# Patient Record
Sex: MALE | Race: WHITE | URBAN - METROPOLITAN AREA
[De-identification: names, ages, dates, MRNs, and addresses within clinical notes are randomized per-mention and may not be internally consistent; named-entity substitution may affect disease eponyms.]

---

## 2018-03-14 RX ORDER — MULTIPLE VITAMINS W/ MINERALS TAB 9MG-400MCG
1 TAB ORAL DAILY
COMMUNITY

## 2018-03-14 RX ORDER — BACLOFEN 20 MG
1 TABLET ORAL
COMMUNITY

## 2018-03-20 ENCOUNTER — ANESTHESIA EVENT (OUTPATIENT)
Dept: SURGERY | Facility: CLINIC | Age: 73
End: 2018-03-20
Payer: MEDICARE

## 2018-03-20 ENCOUNTER — ANESTHESIA (OUTPATIENT)
Dept: SURGERY | Facility: CLINIC | Age: 73
End: 2018-03-20
Payer: MEDICARE

## 2018-03-20 ENCOUNTER — SURGERY (OUTPATIENT)
Age: 73
End: 2018-03-20

## 2018-03-20 ENCOUNTER — HOSPITAL ENCOUNTER (OUTPATIENT)
Facility: CLINIC | Age: 73
Discharge: HOME | End: 2018-03-20
Attending: OPHTHALMOLOGY | Admitting: OPHTHALMOLOGY
Payer: MEDICARE

## 2018-03-20 VITALS
BODY MASS INDEX: 30.11 KG/M2 | OXYGEN SATURATION: 94 % | DIASTOLIC BLOOD PRESSURE: 83 MMHG | RESPIRATION RATE: 16 BRPM | SYSTOLIC BLOOD PRESSURE: 142 MMHG | TEMPERATURE: 98 F | HEART RATE: 60 BPM | HEIGHT: 69 IN | WEIGHT: 203.3 LBS

## 2018-03-20 PROCEDURE — 36000058 ZZH SURGERY LEVEL 3 EA 15 ADDTL MIN: Performed by: OPHTHALMOLOGY

## 2018-03-20 PROCEDURE — 25000125 ZZHC RX 250: Performed by: OPHTHALMOLOGY

## 2018-03-20 PROCEDURE — 37000008 ZZH ANESTHESIA TECHNICAL FEE, 1ST 30 MIN: Performed by: OPHTHALMOLOGY

## 2018-03-20 PROCEDURE — 27210794 ZZH OR GENERAL SUPPLY STERILE: Performed by: OPHTHALMOLOGY

## 2018-03-20 PROCEDURE — 25000131 ZZH RX MED GY IP 250 OP 636 PS 637: Mod: GY | Performed by: NURSE ANESTHETIST, CERTIFIED REGISTERED

## 2018-03-20 PROCEDURE — 88305 TISSUE EXAM BY PATHOLOGIST: CPT | Performed by: OPHTHALMOLOGY

## 2018-03-20 PROCEDURE — 88305 TISSUE EXAM BY PATHOLOGIST: CPT | Mod: 26 | Performed by: OPHTHALMOLOGY

## 2018-03-20 PROCEDURE — 37000009 ZZH ANESTHESIA TECHNICAL FEE, EACH ADDTL 15 MIN: Performed by: OPHTHALMOLOGY

## 2018-03-20 PROCEDURE — 71000013 ZZH RECOVERY PHASE 1 LEVEL 1 EA ADDTL HR: Performed by: OPHTHALMOLOGY

## 2018-03-20 PROCEDURE — 25000125 ZZHC RX 250: Performed by: NURSE ANESTHETIST, CERTIFIED REGISTERED

## 2018-03-20 PROCEDURE — 71000012 ZZH RECOVERY PHASE 1 LEVEL 1 FIRST HR: Performed by: OPHTHALMOLOGY

## 2018-03-20 PROCEDURE — 25000128 H RX IP 250 OP 636: Performed by: NURSE ANESTHETIST, CERTIFIED REGISTERED

## 2018-03-20 PROCEDURE — 40000170 ZZH STATISTIC PRE-PROCEDURE ASSESSMENT II: Performed by: OPHTHALMOLOGY

## 2018-03-20 PROCEDURE — 36000056 ZZH SURGERY LEVEL 3 1ST 30 MIN: Performed by: OPHTHALMOLOGY

## 2018-03-20 PROCEDURE — A9270 NON-COVERED ITEM OR SERVICE: HCPCS | Mod: GY | Performed by: ANESTHESIOLOGY

## 2018-03-20 PROCEDURE — 25000128 H RX IP 250 OP 636: Performed by: ANESTHESIOLOGY

## 2018-03-20 PROCEDURE — 71000027 ZZH RECOVERY PHASE 2 EACH 15 MINS: Performed by: OPHTHALMOLOGY

## 2018-03-20 PROCEDURE — 25000132 ZZH RX MED GY IP 250 OP 250 PS 637: Mod: GY | Performed by: ANESTHESIOLOGY

## 2018-03-20 RX ORDER — SODIUM CHLORIDE, SODIUM LACTATE, POTASSIUM CHLORIDE, CALCIUM CHLORIDE 600; 310; 30; 20 MG/100ML; MG/100ML; MG/100ML; MG/100ML
INJECTION, SOLUTION INTRAVENOUS CONTINUOUS
Status: DISCONTINUED | OUTPATIENT
Start: 2018-03-20 | End: 2018-03-20 | Stop reason: HOSPADM

## 2018-03-20 RX ORDER — NALOXONE HYDROCHLORIDE 0.4 MG/ML
.1-.4 INJECTION, SOLUTION INTRAMUSCULAR; INTRAVENOUS; SUBCUTANEOUS
Status: DISCONTINUED | OUTPATIENT
Start: 2018-03-20 | End: 2018-03-20 | Stop reason: HOSPADM

## 2018-03-20 RX ORDER — ONDANSETRON 2 MG/ML
INJECTION INTRAMUSCULAR; INTRAVENOUS PRN
Status: DISCONTINUED | OUTPATIENT
Start: 2018-03-20 | End: 2018-03-20

## 2018-03-20 RX ORDER — MEPERIDINE HYDROCHLORIDE 25 MG/ML
12.5 INJECTION INTRAMUSCULAR; INTRAVENOUS; SUBCUTANEOUS
Status: DISCONTINUED | OUTPATIENT
Start: 2018-03-20 | End: 2018-03-20 | Stop reason: HOSPADM

## 2018-03-20 RX ORDER — LIDOCAINE HCL/EPINEPHRINE/PF 2%-1:200K
VIAL (ML) INJECTION PRN
Status: DISCONTINUED | OUTPATIENT
Start: 2018-03-20 | End: 2018-03-20 | Stop reason: HOSPADM

## 2018-03-20 RX ORDER — ONDANSETRON 2 MG/ML
4 INJECTION INTRAMUSCULAR; INTRAVENOUS EVERY 30 MIN PRN
Status: DISCONTINUED | OUTPATIENT
Start: 2018-03-20 | End: 2018-03-20 | Stop reason: HOSPADM

## 2018-03-20 RX ORDER — HYDROCODONE BITARTRATE AND ACETAMINOPHEN 5; 325 MG/1; MG/1
1 TABLET ORAL ONCE
Status: COMPLETED | OUTPATIENT
Start: 2018-03-20 | End: 2018-03-20

## 2018-03-20 RX ORDER — LIDOCAINE HYDROCHLORIDE 20 MG/ML
INJECTION, SOLUTION INFILTRATION; PERINEURAL PRN
Status: DISCONTINUED | OUTPATIENT
Start: 2018-03-20 | End: 2018-03-20

## 2018-03-20 RX ORDER — FENTANYL CITRATE 50 UG/ML
25-50 INJECTION, SOLUTION INTRAMUSCULAR; INTRAVENOUS EVERY 5 MIN PRN
Status: DISCONTINUED | OUTPATIENT
Start: 2018-03-20 | End: 2018-03-20 | Stop reason: HOSPADM

## 2018-03-20 RX ORDER — HYDROCODONE BITARTRATE AND ACETAMINOPHEN 5; 325 MG/1; MG/1
1 TABLET ORAL ONCE
Status: DISCONTINUED | OUTPATIENT
Start: 2018-03-20 | End: 2018-03-20 | Stop reason: HOSPADM

## 2018-03-20 RX ORDER — LABETALOL HYDROCHLORIDE 5 MG/ML
INJECTION, SOLUTION INTRAVENOUS PRN
Status: DISCONTINUED | OUTPATIENT
Start: 2018-03-20 | End: 2018-03-20

## 2018-03-20 RX ORDER — HYDRALAZINE HYDROCHLORIDE 20 MG/ML
INJECTION INTRAMUSCULAR; INTRAVENOUS PRN
Status: DISCONTINUED | OUTPATIENT
Start: 2018-03-20 | End: 2018-03-20

## 2018-03-20 RX ORDER — SODIUM CHLORIDE, SODIUM LACTATE, POTASSIUM CHLORIDE, CALCIUM CHLORIDE 600; 310; 30; 20 MG/100ML; MG/100ML; MG/100ML; MG/100ML
INJECTION, SOLUTION INTRAVENOUS CONTINUOUS PRN
Status: DISCONTINUED | OUTPATIENT
Start: 2018-03-20 | End: 2018-03-20

## 2018-03-20 RX ORDER — PHYSOSTIGMINE SALICYLATE 1 MG/ML
1.2 INJECTION INTRAVENOUS
Status: DISCONTINUED | OUTPATIENT
Start: 2018-03-20 | End: 2018-03-20 | Stop reason: HOSPADM

## 2018-03-20 RX ORDER — ERYTHROMYCIN 5 MG/G
OINTMENT OPHTHALMIC PRN
Status: DISCONTINUED | OUTPATIENT
Start: 2018-03-20 | End: 2018-03-20 | Stop reason: HOSPADM

## 2018-03-20 RX ORDER — FENTANYL CITRATE 50 UG/ML
25-50 INJECTION, SOLUTION INTRAMUSCULAR; INTRAVENOUS
Status: DISCONTINUED | OUTPATIENT
Start: 2018-03-20 | End: 2018-03-20 | Stop reason: HOSPADM

## 2018-03-20 RX ORDER — ONDANSETRON 4 MG/1
4 TABLET, ORALLY DISINTEGRATING ORAL EVERY 30 MIN PRN
Status: DISCONTINUED | OUTPATIENT
Start: 2018-03-20 | End: 2018-03-20 | Stop reason: HOSPADM

## 2018-03-20 RX ORDER — PROPOFOL 10 MG/ML
INJECTION, EMULSION INTRAVENOUS PRN
Status: DISCONTINUED | OUTPATIENT
Start: 2018-03-20 | End: 2018-03-20

## 2018-03-20 RX ORDER — FENTANYL CITRATE 50 UG/ML
INJECTION, SOLUTION INTRAMUSCULAR; INTRAVENOUS PRN
Status: DISCONTINUED | OUTPATIENT
Start: 2018-03-20 | End: 2018-03-20

## 2018-03-20 RX ADMIN — HYDRALAZINE HYDROCHLORIDE 5 MG: 20 INJECTION INTRAMUSCULAR; INTRAVENOUS at 10:26

## 2018-03-20 RX ADMIN — ERYTHROMYCIN 1 G: 5 OINTMENT OPHTHALMIC at 10:28

## 2018-03-20 RX ADMIN — LIDOCAINE HYDROCHLORIDE 60 MG: 20 INJECTION, SOLUTION INFILTRATION; PERINEURAL at 08:34

## 2018-03-20 RX ADMIN — FENTANYL CITRATE 25 MCG: 50 INJECTION, SOLUTION INTRAMUSCULAR; INTRAVENOUS at 08:34

## 2018-03-20 RX ADMIN — MIDAZOLAM 1 MG: 1 INJECTION INTRAMUSCULAR; INTRAVENOUS at 09:51

## 2018-03-20 RX ADMIN — PROPOFOL 30 MG: 10 INJECTION, EMULSION INTRAVENOUS at 08:34

## 2018-03-20 RX ADMIN — HYDRALAZINE HYDROCHLORIDE 5 MG: 20 INJECTION INTRAMUSCULAR; INTRAVENOUS at 10:31

## 2018-03-20 RX ADMIN — FENTANYL CITRATE 50 MCG: 50 INJECTION INTRAMUSCULAR; INTRAVENOUS at 11:27

## 2018-03-20 RX ADMIN — LIDOCAINE HYDROCHLORIDE,EPINEPHRINE BITARTRATE 8 ML: 20; .005 INJECTION, SOLUTION EPIDURAL; INFILTRATION; INTRACAUDAL; PERINEURAL at 08:46

## 2018-03-20 RX ADMIN — LABETALOL HYDROCHLORIDE 10 MG: 5 INJECTION INTRAVENOUS at 10:17

## 2018-03-20 RX ADMIN — SODIUM CHLORIDE, POTASSIUM CHLORIDE, SODIUM LACTATE AND CALCIUM CHLORIDE: 600; 310; 30; 20 INJECTION, SOLUTION INTRAVENOUS at 08:33

## 2018-03-20 RX ADMIN — FENTANYL CITRATE 25 MCG: 50 INJECTION, SOLUTION INTRAMUSCULAR; INTRAVENOUS at 10:14

## 2018-03-20 RX ADMIN — ONDANSETRON 4 MG: 2 INJECTION INTRAMUSCULAR; INTRAVENOUS at 08:34

## 2018-03-20 RX ADMIN — FENTANYL CITRATE 25 MCG: 50 INJECTION, SOLUTION INTRAMUSCULAR; INTRAVENOUS at 08:38

## 2018-03-20 RX ADMIN — LABETALOL HYDROCHLORIDE 5 MG: 5 INJECTION INTRAVENOUS at 10:15

## 2018-03-20 RX ADMIN — DEXMEDETOMIDINE HYDROCHLORIDE 4 MCG: 100 INJECTION, SOLUTION INTRAVENOUS at 10:11

## 2018-03-20 RX ADMIN — ERYTHROMYCIN 2 G: 5 OINTMENT OPHTHALMIC at 08:48

## 2018-03-20 RX ADMIN — FENTANYL CITRATE 25 MCG: 50 INJECTION, SOLUTION INTRAMUSCULAR; INTRAVENOUS at 09:54

## 2018-03-20 RX ADMIN — PROPOFOL 20 MG: 10 INJECTION, EMULSION INTRAVENOUS at 08:38

## 2018-03-20 RX ADMIN — LIDOCAINE HYDROCHLORIDE,EPINEPHRINE BITARTRATE 8 ML: 20; .005 INJECTION, SOLUTION EPIDURAL; INFILTRATION; INTRACAUDAL; PERINEURAL at 10:26

## 2018-03-20 RX ADMIN — DEXMEDETOMIDINE HYDROCHLORIDE 8 MCG: 100 INJECTION, SOLUTION INTRAVENOUS at 10:19

## 2018-03-20 RX ADMIN — HYDROCODONE BITARTRATE AND ACETAMINOPHEN 1 TABLET: 5; 325 TABLET ORAL at 11:46

## 2018-03-20 RX ADMIN — MIDAZOLAM 1 MG: 1 INJECTION INTRAMUSCULAR; INTRAVENOUS at 08:34

## 2018-03-20 NOTE — OP NOTE
Procedure Date: 03/20/2018      PREOPERATIVE DIAGNOSES:    1.  Bilateral upper eyelid ptosis.     2.  Bilateral upper eyelid mechanical ptosis.    3.  Bilateral eyebrow ptosis.     4.  Right lower eyelid lesion.     5.  Bilateral lower eyelid dermatochalasis.        POSTOPERATIVE DIAGNOSES:   1.  Bilateral upper eyelid ptosis.     2.  Bilateral upper eyelid mechanical ptosis.    3.  Bilateral eyebrow ptosis.     4.  Right lower eyelid lesion.     5.  Bilateral lower eyelid dermatochalasis.        PROCEDURES:    1.  Repair of bilateral upper eyelid ptosis.     2.  Repair of bilateral upper eyelid mechanical ptosis.     3.  Bilateral eyebrow ptosis repair.   4.  Full thickness wedge excision right lower eyelid.     5.  Bilateral lower eyelid festoon excision.        SURGEON:  Cheng Calabrese MD       ANESTHESIA:  Local, monitored.       COMPLICATIONS:  None.      INDICATIONS FOR PROCEDURE:   The patient has bilateral upper eyelid ptosis, obstructing his vision and interfering with daily activities.  The patient also had excess skin overhanging the upper eyelids, contributing to visual obstruction.  Finally, the eyebrows were resting below the orbital rim, also contributing to visual obstruction.  The patient also had a vascular lesion in the lateral right lower eyelid which demonstrated recent growth.  The patient also requested excision of festooning skin in both lower eyelids.        DESCRIPTION OF PROCEDURE/BILATERAL EYELID PTOSIS:  The patient was taken to the Operating Room and received a local block of 2% Lidocaine without epinephrine.  The block was administered transcutaneously along the eyelid crease and the planned incision line was outlined with a marking pen.  The patient was then prepped and draped in the usual sterile fashion.  The incision was then made along the previously marked area.  A moderate amount of skin was excised taking care to allow adequate closure and avoid lagophthalmos. Lou scissors  were then used to develop a plane over the septum.  The septum was opened and a small amount of orbital fat was removed.  Levator aponeurosis was then disinserted from the tarsus and a plane was developed between the aponeurosis and the underlying tarsus and Pretty's muscle.  A 5-0 Mersilene suture was then passed through the tarsus in a lamellar fashion and externalized through the levator aponeurosis.  This was tied off in a temporary fashion and the patient was asked to sit up and the eyelids height and contour evaluated.  This was repeated until a satisfactory height and contour were obtained, and two additional sutures were placed lateral to the initial suture.  This resulted in a normal contour and height of bilateral upper eyelids.  Attempted overcorrection of 0.5 mm was obtained.  The redundant levator aponeurosis was then excised and the skin was then closed with running 6-0 fast absorbing suture.  The patient left the Operating Room in stable condition.       BILATERAL EYEBROW PTOSIS REPAIR:  The patient was taken into the Operating Room and the planned excision was outlined along the upper aspect of both eyebrows. The eyebrows were then injected with 2% lidocaine with 1:100,000 epinephrine. The patient was prepped and draped in the usual sterile fashion. An ellipse of skin and subcutaneous tissue was excised with a #15 blade and cutting cautery. Care was taken to allow for good eyelid closure. Hemostasis was obtained with the [facility] cautery. Deep tissues were then closed with interrupted 5-0 Vicryl suture. The skin was closed with running 6-0 nylon suture, taking care to mona the skin margins. The patient left the Operating Room in stable condition.      In the right lower eyelid, a full-thickness incision was made medial and lateral to the clinically involved area. Hemostasis was obtained with needle-tipped [facility] cautery.  Deep tissues were closed with 5-0 Vicryl suture at the level of the  tarsal plate.  Eyelid margin was closed with 6-0 chromic at the gray line, lash line and pretarsal skin.  The planned excision of skin in the lower eyelids was outlined with a fine-tipped marking pen.  The redundant skin was excised using a 15 blade and Esau scissors.  Care was taken to leave adequate skin for good eyelid closure.  The patient tolerated the procedure well.              YURY GAY MD             D: 2018   T: 2018   MT: JEAN MARIE      Name:     AYESHA VELAZQUEZ   MRN:      [id number]        Account:        [id number]   :      1945           Procedure Date: 2018      Document: [id number]

## 2018-03-20 NOTE — ANESTHESIA CARE TRANSFER NOTE
Patient: Merle Sahran    Procedure(s):  BILATERAL UPPER LID PTOSIS, MECHANICAL PTOSIS AND BROW PTOSIS, RIGHT LOWER LID WEDGE EXCISION, COSMETIC BILATERAL LOWER LID BLEPHAROPLASTY  - Wound Class: I-Clean   - Wound Class: I-Clean   - Wound Class: I-Clean   - Wound Class: I-Clean    Diagnosis: BILATERAL UPPER LID PTOSIS, MECHANICAL AND ROW, RIGHT LOWER LID LESION   Diagnosis Additional Information: No value filed.    Anesthesia Type:   MAC     Note:  Airway :Room Air  Patient transferred to:PACU  Comments: Transferred to [facility] recovery room in recliner with armrests up, spontaneous respirations, O2 saturation maintained at 95% on room air. Pt sat on edge of bed and denied dizziness prior to transfer B/P 167/91 upon transfer to PACU. All monitors and alarms on and functioning, clinically stable vital signs. Report given to recovery RN and questions answered. Patient alert and following verbal directions.Handoff Report: Identifed the Patient, Identified the Reponsible Provider, Reviewed the pertinent medical history, Discussed the surgical course, Reviewed Intra-OP anesthesia mangement and issues during anesthesia, Set expectations for post-procedure period and Allowed opportunity for questions and acknowledgement of understanding      Vitals: (Last set prior to Anesthesia Care Transfer)    CRNA VITALS  3/20/2018 1004 - 3/20/2018 1041      3/20/2018             NIBP: (!)  173/99    Pulse: 69    NIBP Mean: 132    SpO2: 94 %    Resp Rate (set): 10                Electronically Signed By: ALESSANDRO Vallejo CRNA  March 20, 2018  10:41 AM

## 2018-03-20 NOTE — DISCHARGE INSTRUCTIONS
Dr. Calabrese has prescribed Norco/Vicodin for pain for you. It's a combination medication of Tylenol 325 mg and Hydrocodone 5 mg.  There is a limit of 4000 mg of Tylenol per 24 hours.   You were given one of these pain pills at 11:30 am today.     You also were prescribed Erythromycin ointment.  Apply per pharmacy label instructions.        Same Day Surgery Discharge Instructions for  Sedation and General Anesthesia       It's not unusual to feel dizzy, light-headed or faint for up to 24 hours after surgery or while taking pain medication.  If you have these symptoms: sit for a few minutes before standing and have someone assist you when you get up to walk or use the bathroom.      You should rest and relax for the next 24 hours. We recommend you make arrangements to have an adult stay with you for at least 24 hours after your discharge.  Avoid hazardous and strenuous activity.      DO NOT DRIVE any vehicle or operate mechanical equipment for 24 hours following the end of your surgery.  Even though you may feel normal, your reactions may be affected by the medication you have received.      Do not drink alcoholic beverages for 24 hours following surgery.       Slowly progress to your regular diet as you feel able. It's not unusual to feel nauseated and/or vomit after receiving anesthesia.  If you develop these symptoms, drink clear liquids (apple juice, ginger ale, broth, 7-up, etc. ) until you feel better.  If your nausea and vomiting persists for 24 hours, please notify your surgeon.        All narcotic pain medications, along with inactivity and anesthesia, can cause constipation. Drinking plenty of liquids and increasing fiber intake will help.      For any questions of a medical nature, call your surgeon.      Do not make important decisions for 24 hours.      If you had general anesthesia, you may have a sore throat for a couple of days related to the breathing tube used during surgery.  You may use Cepacol  lozenges to help with this discomfort.  If it worsens or if you develop a fever, contact your surgeon.       If you feel your pain is not well managed with the pain medications prescribed by your surgeon, please contact your surgeon's office to let them know so they can address your concerns.         [facility]   Eyelid/Orbital Surgery Discharge Instructions    Cheng Calabrese M.D..     ICE COMPRESSES  Immediately following surgery, you should begin to apply ice compresses.  Apply a cold gel pack or wrap a clean washcloth around a cup of crushed ice in a plastic bag (a bag of frozen peas also works well) and hold the cold compresses directly against the closed eyelid (s).Apply cold pack for a minimum of six times daily for no longer than 15 minutes at a time. Continue cold compresses every day until the bruising and swelling begin to subside.  This can vary for each patient, but three 3 days may be common.    HOT COMPRESSES  After your swelling and bruising have begun to subside, hot compresses should be applied.  Take a clean washcloth and wring it out in hot water (as warm as you can tolerate comfortably).  Hold this warm compress against the closed eyelid(s) at least six times per day for 15 minutes.  This should be continued for about two weeks.    OINTMENT  You may be given some ointment when you leave the hospital.  Apply this ointment to the suture line(s) twice a day, 1/4 inch into the eye at bedtime for 7 days.  Expect some blurring of vision from the ointment.     ACTIVITY  Avoid heavy lifting or vigorous exercise for one week after surgery.  You may resume regular activities as tolerated.  You may shower and wash your hair on the day after surgery; be careful to avoid getting shampoo in your eyes. While your eyes are still swelling, it is recommended you sleep on your back and elevate your head with 2-3 pillows.    MEDICATION  If the doctor has given you some medications to take after  surgery, please take these according to the instructions on the bottle.  Pain medications may make you drowsy so do not drive, operate heavy machinery, or use alcohol while taking it.  When you feel that you do not need the prescription pain medication, you may substitute Extra Strength Tylenol for mild pain by also following the directions on the bottle.    If you were taking Coumadin (warfarin) prior to your surgery, you may resume this medication with your next scheduled dose.    WHAT TO EXPECT  You should expect some slight oozing of blood from the incision site over the first two to three days after surgery.  Swelling and bruising will occur for one to two weeks or longer.  You may also experience itching and tearing during the first several weeks after surgery.  This is part of the normal healing process    QUESTIONS  Please feel free to contact the office, should you have any questions that are not answered above.  The phone number is [phone].  Please call immediately if you are unable to establish vision in the operative eye, you are experiencing heavy bleeding that will not stop with gentle pressure or you have any signs of an infection (greenish/yellow discharge or progressive redness).    [facility]  [facility]  [address]  [place], [place] [address]        **If you have questions or concerns about your procedure,  call Dr. Calabrese at [phone]**

## 2018-03-20 NOTE — IP AVS SNAPSHOT
[facility] Same Day Surgery    [address]    [place] [address]    Phone:  [phone]    Fax:  [phone]                                       After Visit Summary   3/20/2018    Jeri Tsang    MRN: [id number]           After Visit Summary Signature Page     I have received my discharge instructions, and my questions have been answered. I have discussed any challenges I see with this plan with the nurse or doctor.    ..........................................................................................................................................  Patient/Patient Representative Signature      ..........................................................................................................................................  Patient Representative Print Name and Relationship to Patient    ..................................................               ................................................  Date                                            Time    ..........................................................................................................................................  Reviewed by Signature/Title    ...................................................              ..............................................  Date                                                            Time

## 2018-03-20 NOTE — BRIEF OP NOTE
[facility] Brief Operative Note    Pre-operative diagnosis: BILATERAL UPPER LID PTOSIS, MECHANICAL AND ROW, RIGHT LOWER LID LESION    Post-operative diagnosis * No post-op diagnosis entered *     Procedure: Procedure(s):  BILATERAL UPPER LID PTOSIS, MECHANICAL PTOSIS AND BROW PTOSIS, RIGHT LOWER LID WEDGE EXCISION, COSMETIC BILATERAL LOWER LID BLEPHAROPLASTY  - Wound Class: I-Clean   - Wound Class: I-Clean   - Wound Class: I-Clean   - Wound Class: I-Clean   Surgeon(s): Surgeon(s) and Role:  Panel 1:     * Cheng Calabrese MD - Primary    Panel 2:     * Cheng Calabrese MD - Primary   Estimated blood loss: * No values recorded between 3/20/2018  8:45 AM and 3/20/2018 10:35 AM *    Specimens:   ID Type Source Tests Collected by Time Destination   A : Lesion Right Lower Eyelid Tissue Lower Eyelid SURGICAL PATHOLOGY EXAM Cheng Calabrese MD 3/20/2018  8:47 AM       Findings:

## 2018-03-20 NOTE — ANESTHESIA PREPROCEDURE EVALUATION
"  Anesthesia Evaluation     . Pt has had prior anesthetic.     History of anesthetic complications (difficult intubation by H&P; patient unaware of this)   - difficult intubation        ROS/MED HX    ENT/Pulmonary:     (+)sleep apnea, uses CPAP , . .    Neurologic:     (+)neuropathy - peripheral,     Cardiovascular:     (+) hypertension----. : . . . :. .       METS/Exercise Tolerance:     Hematologic:         Musculoskeletal:   (+) , , other musculoskeletal- S/P \"spinal fixation surgery\"      GI/Hepatic:     (+) GERD Asymptomatic on medication,       Renal/Genitourinary:         Endo:         Psychiatric:     (+) psychiatric history bipolar and depression      Infectious Disease:         Malignancy:         Other:                     Physical Exam  Normal systems: cardiovascular and pulmonary    Airway   Mallampati: II  TM distance: >3 FB  Neck ROM: limited    Dental     Cardiovascular       Pulmonary                     Anesthesia Plan      History & Physical Review  History and physical reviewed and following examination; no interval change.    ASA Status:  2 .    NPO Status:  > 8 hours    Plan for MAC          Postoperative Care  Postoperative pain management:  IV analgesics and Oral pain medications.      Consents        DPreop diagnosis: BILATERAL UPPER LID PTOSIS, MECHANICAL AND ROW, RIGHT LOWER LID LESION   Procedure(s):  REPAIR PTOSIS BILATERAL  REPAIR PTOSIS BROW BILATERAL  EXCISE LESION EYELID  COSMETIC BLEPHAROPLASTY LOWER LIDS BILATERAL  Allergies   Allergen Reactions     Adhesive Tape      Silicone       No current facility-administered medications on file prior to encounter.   No current outpatient prescriptions on file prior to encounter.  No results found for: HGB, INR, POTASSIUM                    .  "

## 2018-03-20 NOTE — IP AVS SNAPSHOT
MRN:[id number]                      After Visit Summary   3/20/2018    Jeri Tsang    MRN: [id number]           Thank you!     Thank you for choosing [facility] for your care. Our goal is always to provide you with excellent care. Hearing back from our patients is one way we can continue to improve our services. Please take a few minutes to complete the written survey that you may receive in the mail after you visit with us. Thank you!        Patient Information     Date Of Birth          1945        About your hospital stay     You were admitted on:  March 20, 2018 You last received care in the:  [facility] Same Day Surgery    You were discharged on:  March 20, 2018       Who to Call     For medical emergencies, please call 911.  For non-urgent questions about your medical care, please call your primary care provider or clinic, [phone]  For questions related to your surgery, please call your surgery clinic        Attending Provider     Provider Specialty    Cheng Calabrese MD Ophthalmology       Primary Care Provider Office Phone # Fax #    Miguel Dean [phone]      After Care Instructions     Discharge Medication Instructions       Do NOT take aspirin or medications containing NSAIDS for 72 hours after procedure.            Ice to affected area       Apply cold pack for 15 minutes on, 15 minutes off, for 48 hours while awake.                  Further instructions from your care team         Dr. Calabrese has prescribed Norco/Vicodin for pain for you. It's a combination medication of Tylenol 325 mg and Hydrocodone 5 mg.  There is a limit of 4000 mg of Tylenol per 24 hours.   You were given one of these pain pills at 11:30 am today.     You also were prescribed Erythromycin ointment.  Apply per pharmacy label instructions.        Same Day Surgery Discharge Instructions for  Sedation and General Anesthesia       It's not unusual to feel dizzy,  light-headed or faint for up to 24 hours after surgery or while taking pain medication.  If you have these symptoms: sit for a few minutes before standing and have someone assist you when you get up to walk or use the bathroom.      You should rest and relax for the next 24 hours. We recommend you make arrangements to have an adult stay with you for at least 24 hours after your discharge.  Avoid hazardous and strenuous activity.      DO NOT DRIVE any vehicle or operate mechanical equipment for 24 hours following the end of your surgery.  Even though you may feel normal, your reactions may be affected by the medication you have received.      Do not drink alcoholic beverages for 24 hours following surgery.       Slowly progress to your regular diet as you feel able. It's not unusual to feel nauseated and/or vomit after receiving anesthesia.  If you develop these symptoms, drink clear liquids (apple juice, ginger ale, broth, 7-up, etc. ) until you feel better.  If your nausea and vomiting persists for 24 hours, please notify your surgeon.        All narcotic pain medications, along with inactivity and anesthesia, can cause constipation. Drinking plenty of liquids and increasing fiber intake will help.      For any questions of a medical nature, call your surgeon.      Do not make important decisions for 24 hours.      If you had general anesthesia, you may have a sore throat for a couple of days related to the breathing tube used during surgery.  You may use Cepacol lozenges to help with this discomfort.  If it worsens or if you develop a fever, contact your surgeon.       If you feel your pain is not well managed with the pain medications prescribed by your surgeon, please contact your surgeon's office to let them know so they can address your concerns.         [facility]   Eyelid/Orbital Surgery Discharge Instructions    Cheng Calabrese M.D..     ICE COMPRESSES  Immediately following surgery, you  should begin to apply ice compresses.  Apply a cold gel pack or wrap a clean washcloth around a cup of crushed ice in a plastic bag (a bag of frozen peas also works well) and hold the cold compresses directly against the closed eyelid (s).Apply cold pack for a minimum of six times daily for no longer than 15 minutes at a time. Continue cold compresses every day until the bruising and swelling begin to subside.  This can vary for each patient, but three 3 days may be common.    HOT COMPRESSES  After your swelling and bruising have begun to subside, hot compresses should be applied.  Take a clean washcloth and wring it out in hot water (as warm as you can tolerate comfortably).  Hold this warm compress against the closed eyelid(s) at least six times per day for 15 minutes.  This should be continued for about two weeks.    OINTMENT  You may be given some ointment when you leave the hospital.  Apply this ointment to the suture line(s) twice a day, 1/4 inch into the eye at bedtime for 7 days.  Expect some blurring of vision from the ointment.     ACTIVITY  Avoid heavy lifting or vigorous exercise for one week after surgery.  You may resume regular activities as tolerated.  You may shower and wash your hair on the day after surgery; be careful to avoid getting shampoo in your eyes. While your eyes are still swelling, it is recommended you sleep on your back and elevate your head with 2-3 pillows.    MEDICATION  If the doctor has given you some medications to take after surgery, please take these according to the instructions on the bottle.  Pain medications may make you drowsy so do not drive, operate heavy machinery, or use alcohol while taking it.  When you feel that you do not need the prescription pain medication, you may substitute Extra Strength Tylenol for mild pain by also following the directions on the bottle.    If you were taking Coumadin (warfarin) prior to your surgery, you may resume this medication with  "your next scheduled dose.    WHAT TO EXPECT  You should expect some slight oozing of blood from the incision site over the first two to three days after surgery.  Swelling and bruising will occur for one to two weeks or longer.  You may also experience itching and tearing during the first several weeks after surgery.  This is part of the normal healing process    QUESTIONS  Please feel free to contact the office, should you have any questions that are not answered above.  The phone number is [phone].  Please call immediately if you are unable to establish vision in the operative eye, you are experiencing heavy bleeding that will not stop with gentle pressure or you have any signs of an infection (greenish/yellow discharge or progressive redness).    [facility]  [facility] [place] [address]  [place], [place] [address]        **If you have questions or concerns about your procedure,  call Dr. Calabrese at [phone]**          Pending Results     Date and Time Order Name Status Description    3/20/2018 0851 Surgical pathology exam In process             Admission Information     Date & Time Provider Department Dept. Phone    3/20/2018 Cheng Calabrese MD [facility] Same Day Surgery [phone]      Your Vitals Were     Blood Pressure Pulse Temperature Respirations Height Weight    167/89 60 98  F (36.7  C) (Temporal) 17 1.753 m (5' 9\") 92.2 kg (203 lb 4.8 oz)    Pulse Oximetry BMI (Body Mass Index)                93% 30.02 kg/m2          [employer] Information     [employer] lets you send messages to your doctor, view your test results, renew your prescriptions, schedule appointments and more. To sign up, go to [email or web address] . Click on \"Log in\" on the left side of the screen, which will take you to the Welcome page. Then click on \"Sign up Now\" on the right side of the page.     You will be asked to enter the access code listed " below, as well as some personal information. Please follow the directions to create your username and password.     Your access code is: [email or web address]  Expires: 2018 11:55 AM     Your access code will  in 90 days. If you need help or a new code, please call your [place] clinic or [phone].        Care EveryWhere ID     This is your Care EveryWhere ID. This could be used by other organizations to access your [place] medical records  [id number]        Equal Access to Services     ANH BURNS : Hadpawel navarrete Sodesiree, walitzyda luqadaha, qaybta kaalmajaret espinoza, chong elise . So [facility] [phone].    ATENCIÓN: Si habla español, tiene a rodriguez disposición servicios gratuitos de asistencia lingüística. Llame al [phone].    We comply with applicable federal civil rights laws and [place] laws. We do not discriminate on the basis of race, color, national origin, age, disability, sex, sexual orientation, or gender identity.               Review of your medicines      CONTINUE these medicines which have NOT CHANGED        Dose / Directions    ALPHA-LIPOIC ACID PO        Dose:  300 mg   Take 300 mg by mouth daily (with breakfast) (3 x 300 mg = 900 mg dose)   Refills:  0       CALCIUM PO        Dose:  1000 mg   Take 1,000 mg by mouth daily (with dinner)   Refills:  0       CHERRY PO        Dose:  1000 mg   Take 1,000 mg by mouth 3 times daily (2 x 500 mg = 1000 mg dose)   Refills:  0       Coenzyme Q10-Levocarnitine  MG Caps        Dose:  1 capsule   Take 1 capsule by mouth daily (with dinner)   Refills:  0       * FISH OIL PO        Dose:  1000 mg   Take 1,000 mg by mouth daily   Refills:  0       * FISH OIL PO        Dose:  800 mg   Take 800 mg by mouth daily (with dinner)   Refills:  0       * GLUCOSAMINE CHONDROITIN MSM PO        Dose:  1 tablet   Take 1 tablet by mouth 3 times daily   Refills:  0       * PROSTATE THERAPY COMPLEX PO        Dose:  2 tablet   Take  2 tablets by mouth daily   Refills:  0       GLUCOSAMINE SULFATE PO        Dose:  1 tablet   Take 1 tablet by mouth 2 times daily   Refills:  0       GRAPESEED EXTRACT PO        Dose:  100 mg   Take 100 mg by mouth every 24 hours At 12:00   Refills:  0       LEXAPRO PO        Dose:  20 mg   Take 20 mg by mouth daily   Refills:  0       LISINOPRIL PO        Dose:  40 mg   Take 40 mg by mouth daily   Refills:  0       MAGNESIUM PO        Dose:  500 mg   Take 500 mg by mouth daily (with dinner)   Refills:  0       MILK THISTLE PO        Dose:  200 mg   Take 200 mg by mouth daily (with lunch)   Refills:  0       Multi-vitamin Tabs tablet        Dose:  1 tablet   Take 1 tablet by mouth daily   Refills:  0       OMEPRAZOLE PO        Dose:  20 mg   Take 20 mg by mouth daily (with breakfast)   Refills:  0       PROBIOTIC DAILY PO        Dose:  1 capsule   Take 1 capsule by mouth daily   Refills:  0       RED YEAST RICE PO        Dose:  1200 mg   Take 1,200 mg by mouth 3 times daily (with meals)   Refills:  0       VITAMIN B 12 PO        Dose:  500 mcg   Take 500 mcg by mouth twice a week Monday and Thursday with Dinner   Refills:  0       VITAMIN C PO        Dose:  1000 mg   Take 1,000 mg by mouth daily (with lunch)   Refills:  0       VITAMIN D (CHOLECALCIFEROL) PO        Dose:  500 Units   Take 500 Units by mouth daily (with dinner)   Refills:  0       * Notice:  This list has 4 medication(s) that are the same as other medications prescribed for you. Read the directions carefully, and ask your doctor or other care provider to review them with you.      STOP taking     ASPIRIN PO                    Protect others around you: Learn how to safely use, store and throw away your medicines at www.disposemymeds.org.             Medication List: This is a list of all your medications and when to take them. Check marks below indicate your daily home schedule. Keep this list as a reference.      Medications           Morning  Afternoon Evening Bedtime As Needed    ALPHA-LIPOIC ACID PO   Take 300 mg by mouth daily (with breakfast) (3 x 300 mg = 900 mg dose)                                CALCIUM PO   Take 1,000 mg by mouth daily (with dinner)                                DOWNEY PO   Take 1,000 mg by mouth 3 times daily (2 x 500 mg = 1000 mg dose)                                Coenzyme Q10-Levocarnitine  MG Caps   Take 1 capsule by mouth daily (with dinner)                                * FISH OIL PO   Take 1,000 mg by mouth daily                                * FISH OIL PO   Take 800 mg by mouth daily (with dinner)                                * GLUCOSAMINE CHONDROITIN MSM PO   Take 1 tablet by mouth 3 times daily                                * PROSTATE THERAPY COMPLEX PO   Take 2 tablets by mouth daily                                GLUCOSAMINE SULFATE PO   Take 1 tablet by mouth 2 times daily                                GRAPESEED EXTRACT PO   Take 100 mg by mouth every 24 hours At 12:00                                LEXAPRO PO   Take 20 mg by mouth daily                                LISINOPRIL PO   Take 40 mg by mouth daily                                MAGNESIUM PO   Take 500 mg by mouth daily (with dinner)                                MILK THISTLE PO   Take 200 mg by mouth daily (with lunch)                                Multi-vitamin Tabs tablet   Take 1 tablet by mouth daily                                OMEPRAZOLE PO   Take 20 mg by mouth daily (with breakfast)                                PROBIOTIC DAILY PO   Take 1 capsule by mouth daily                                RED YEAST RICE PO   Take 1,200 mg by mouth 3 times daily (with meals)                                VITAMIN B 12 PO   Take 500 mcg by mouth twice a week Monday and Thursday with Dinner                                VITAMIN C PO   Take 1,000 mg by mouth daily (with lunch)                                VITAMIN D (CHOLECALCIFEROL) PO    Take 500 Units by mouth daily (with dinner)                                * Notice:  This list has 4 medication(s) that are the same as other medications prescribed for you. Read the directions carefully, and ask your doctor or other care provider to review them with you.

## 2018-03-20 NOTE — ANESTHESIA POSTPROCEDURE EVALUATION
Patient: Merle Sharan    Procedure(s):  BILATERAL UPPER LID PTOSIS, MECHANICAL PTOSIS AND BROW PTOSIS, RIGHT LOWER LID WEDGE EXCISION, COSMETIC BILATERAL LOWER LID BLEPHAROPLASTY  - Wound Class: I-Clean   - Wound Class: I-Clean   - Wound Class: I-Clean   - Wound Class: I-Clean    Diagnosis:BILATERAL UPPER LID PTOSIS, MECHANICAL AND ROW, RIGHT LOWER LID LESION   Diagnosis Additional Information: No value filed.    Anesthesia Type:  MAC    Note:  Anesthesia Post Evaluation    Patient location during evaluation: PACU  Patient participation: Able to fully participate in evaluation  Level of consciousness: awake  Pain management: adequate  Airway patency: patent  Cardiovascular status: acceptable  Respiratory status: acceptable  Hydration status: acceptable  PONV: controlled     Anesthetic complications: None          Last vitals:  Vitals:    03/20/18 0653 03/20/18 1037 03/20/18 1045   BP: (!) 186/91 (!) 167/91 164/87   Pulse: 60     Resp: 18 16 16   Temp: 36.4  C (97.6  F) 36.7  C (98  F)    SpO2: 98% 94% 94%         Electronically Signed By: Lobo Coats MD  March 20, 2018  10:51 AM

## 2018-03-21 LAB — COPATH REPORT: NORMAL

## (undated) DEVICE — NDL ANGIOCATH 20GA 1.25" 4056

## (undated) DEVICE — SU VICRYL 5-0 P-1 18" UND J490G

## (undated) DEVICE — ESU PENCIL W/SMOKE EVAC E2515HS

## (undated) DEVICE — SUCTION CANISTER MEDIVAC LINER 3000ML W/LID 65651-530

## (undated) DEVICE — PACK OCULOPLATIC SEN15OCFSD

## (undated) DEVICE — SU MERSILENE 5-0 S-24 18" 1764G

## (undated) DEVICE — APPLICATOR COTTON TIP 3" PKG OF 10 34831010

## (undated) DEVICE — ESU ELEC NDL 1" E1552

## (undated) DEVICE — PEN MARKING SKIN FINE 31145942

## (undated) DEVICE — SU CHROMIC 6-0 G-1 18" 790G

## (undated) DEVICE — GLOVE PROTEXIS W/NEU-THERA 8.5  2D73TE85

## (undated) DEVICE — SPONGE RAY-TEC 4X8" 7318

## (undated) DEVICE — SU VICRYL 5-0 P-3 18" UND J493G

## (undated) DEVICE — LINEN TOWEL PACK X5 5464

## (undated) DEVICE — SU PLAIN FAST ABSORB 6-0 PC-1 18" 1916G

## (undated) DEVICE — BLADE KNIFE SURG 15 371115

## (undated) DEVICE — SOL NACL 0.9% IRRIG 1000ML BOTTLE 07138-09

## (undated) DEVICE — TUBING SUCTION 12"X1/4" N612

## (undated) DEVICE — SU ETHILON 6-0 P-1 18" 697G

## (undated) RX ORDER — FENTANYL CITRATE 50 UG/ML
INJECTION, SOLUTION INTRAMUSCULAR; INTRAVENOUS
Status: DISPENSED
Start: 2018-03-20

## (undated) RX ORDER — HYDROCODONE BITARTRATE AND ACETAMINOPHEN 5; 325 MG/1; MG/1
TABLET ORAL
Status: DISPENSED
Start: 2018-03-20